# Patient Record
Sex: MALE | Race: BLACK OR AFRICAN AMERICAN | NOT HISPANIC OR LATINO | URBAN - METROPOLITAN AREA
[De-identification: names, ages, dates, MRNs, and addresses within clinical notes are randomized per-mention and may not be internally consistent; named-entity substitution may affect disease eponyms.]

---

## 2018-10-24 ENCOUNTER — EMERGENCY (EMERGENCY)
Facility: HOSPITAL | Age: 63
LOS: 1 days | Discharge: HOME | End: 2018-10-24
Attending: EMERGENCY MEDICINE | Admitting: EMERGENCY MEDICINE
Payer: MEDICARE

## 2018-10-24 VITALS
DIASTOLIC BLOOD PRESSURE: 110 MMHG | TEMPERATURE: 98 F | OXYGEN SATURATION: 99 % | RESPIRATION RATE: 18 BRPM | HEART RATE: 82 BPM | SYSTOLIC BLOOD PRESSURE: 215 MMHG

## 2018-10-24 VITALS
HEART RATE: 80 BPM | SYSTOLIC BLOOD PRESSURE: 149 MMHG | RESPIRATION RATE: 17 BRPM | DIASTOLIC BLOOD PRESSURE: 84 MMHG | OXYGEN SATURATION: 100 %

## 2018-10-24 LAB
BASOPHILS # BLD AUTO: 0.04 K/UL — SIGNIFICANT CHANGE UP (ref 0–0.2)
BASOPHILS NFR BLD AUTO: 0.5 % — SIGNIFICANT CHANGE UP (ref 0–2)
CK MB BLD-MCNC: 3.2 NG/ML — SIGNIFICANT CHANGE UP (ref 1–6.6)
CK SERPL-CCNC: 586 U/L — HIGH (ref 30–200)
EOSINOPHIL # BLD AUTO: 0.03 K/UL — SIGNIFICANT CHANGE UP (ref 0–0.5)
EOSINOPHIL NFR BLD AUTO: 0.4 % — SIGNIFICANT CHANGE UP (ref 0–6)
HCT VFR BLD CALC: 40.9 % — SIGNIFICANT CHANGE UP (ref 39–50)
HGB BLD-MCNC: 14 G/DL — SIGNIFICANT CHANGE UP (ref 13–17)
IMM GRANULOCYTES # BLD AUTO: 0.03 # — SIGNIFICANT CHANGE UP
IMM GRANULOCYTES NFR BLD AUTO: 0.4 % — SIGNIFICANT CHANGE UP (ref 0–1.5)
LYMPHOCYTES # BLD AUTO: 0.86 K/UL — LOW (ref 1–3.3)
LYMPHOCYTES # BLD AUTO: 11.5 % — LOW (ref 13–44)
MCHC RBC-ENTMCNC: 31.2 PG — SIGNIFICANT CHANGE UP (ref 27–34)
MCHC RBC-ENTMCNC: 34.2 % — SIGNIFICANT CHANGE UP (ref 32–36)
MCV RBC AUTO: 91.1 FL — SIGNIFICANT CHANGE UP (ref 80–100)
MONOCYTES # BLD AUTO: 0.27 K/UL — SIGNIFICANT CHANGE UP (ref 0–0.9)
MONOCYTES NFR BLD AUTO: 3.6 % — SIGNIFICANT CHANGE UP (ref 2–14)
NEUTROPHILS # BLD AUTO: 6.25 K/UL — SIGNIFICANT CHANGE UP (ref 1.8–7.4)
NEUTROPHILS NFR BLD AUTO: 83.6 % — HIGH (ref 43–77)
NRBC # FLD: 0 — SIGNIFICANT CHANGE UP
PLATELET # BLD AUTO: 199 K/UL — SIGNIFICANT CHANGE UP (ref 150–400)
PMV BLD: 11.6 FL — SIGNIFICANT CHANGE UP (ref 7–13)
RBC # BLD: 4.49 M/UL — SIGNIFICANT CHANGE UP (ref 4.2–5.8)
RBC # FLD: 11.8 % — SIGNIFICANT CHANGE UP (ref 10.3–14.5)
TROPONIN T, HIGH SENSITIVITY: 12 NG/L — SIGNIFICANT CHANGE UP (ref ?–14)
WBC # BLD: 7.48 K/UL — SIGNIFICANT CHANGE UP (ref 3.8–10.5)
WBC # FLD AUTO: 7.48 K/UL — SIGNIFICANT CHANGE UP (ref 3.8–10.5)

## 2018-10-24 PROCEDURE — 99283 EMERGENCY DEPT VISIT LOW MDM: CPT | Mod: 25,GC

## 2018-10-24 PROCEDURE — 93010 ELECTROCARDIOGRAM REPORT: CPT

## 2018-10-24 RX ADMIN — Medication 0.3 MILLIGRAM(S): at 21:34

## 2018-10-24 NOTE — ED ADULT NURSE NOTE - OBJECTIVE STATEMENT
pt arrives w/ c/o elevated bp and not feeling well. pt states he was at his pmd and told he had an abnormal EKG. pt states he has been feeling very weak lately but denies any chest pain/sob/n/v/abdominal pain. 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 pt states he ambulates with cane and has swelling to lower extremities normally but states the swelling went down. pt placed on cardiac monitor NS rhythm notes resp even and unlabored. waiting further orders will continue to monitor.

## 2018-10-24 NOTE — ED PROVIDER NOTE - CARE PLAN
Principal Discharge DX:	Uncontrolled hypertension  Assessment and plan of treatment:	You presented to the hospital for uncontrolled blood pressures with changes on the EKG.  You were given medication called Clonidine to control your blood pressure.  Your heart enzymes were unchanged. Please follow up with a Cardiologist in 1 week for further management.  Secondary Diagnosis:	EKG abnormalities

## 2018-10-24 NOTE — ED PROVIDER NOTE - PLAN OF CARE
You presented to the hospital for uncontrolled blood pressures with changes on the EKG.  You were given medication called Clonidine to control your blood pressure.  Your heart enzymes were unchanged. Please follow up with a Cardiologist in 1 week for further management.

## 2018-10-24 NOTE — ED PROVIDER NOTE - ATTENDING CONTRIBUTION TO CARE
I agree with the above H&P.  Briefly this is a 63 year old presenting with elevated bp and ekg changes. patient seen today at [facility] care for routine check up.  EKG with new t wave changes laterally.  bp elevated. patient without cp.  concern for new ekg changes. will check labs trops.

## 2018-10-24 NOTE — ED PROVIDER NOTE - MEDICAL DECISION MAKING DETAILS
62M remote CVA, uncontrolled HTN p/w hypertensive emergency s/p Clonidine 0.3mg in PMD office. New TWI V4-V6. No active CP. Give STAT dose Clonidine 0.3 now. Pending CBC, CMP, Cardiac Enzymes.   Likely Admit pending workup.

## 2018-10-24 NOTE — ED PROVIDER NOTE - OBJECTIVE STATEMENT
63M CVA 5 years ago with residual right sided weakness, HTN presenting to the ED from PMD office for uncontrolled HTN and EKG changes.   Patient reports that he went in for routine follow up visit and was found to have SBP>200s. Denies HA, lightheadedness, dizziness, chest pain, palpitations, abdominal pain. No worsening of focal deficits. Patient received total of Clonidine 0.3 in PMD office. R/p BP 5:00pm 136/67.   PMD: [facility] -->Dr. Ashley Meyer

## 2018-10-24 NOTE — ED ADULT NURSE NOTE - NSIMPLEMENTINTERV_GEN_ALL_ED
Implemented All Fall with Harm Risk Interventions:  [place] to call system. Call bell, personal items and telephone within reach. Instruct patient to call for assistance. Room bathroom lighting operational. Non-slip footwear when patient is off stretcher. Physically safe environment: no spills, clutter or unnecessary equipment. Stretcher in lowest position, wheels locked, appropriate side rails in place. Provide visual cue, wrist band, yellow gown, etc. Monitor gait and stability. Monitor for mental status changes and reorient to person, place, and time. Review medications for side effects contributing to fall risk. Reinforce activity limits and safety measures with patient and family. Provide visual clues: red socks.

## 2018-10-24 NOTE — ED ADULT NURSE NOTE - CHIEF COMPLAINT QUOTE
Pt brought to ER from [facility] by EMS after he went in because he did not feel well and was noted to have ST depressions on his EKG.

## 2018-10-24 NOTE — ED ADULT TRIAGE NOTE - CHIEF COMPLAINT QUOTE
Pt brought to ER from [facility] by EMS after he went in because he did not feel well and was noted to have ST depressions on his EKG.

## 2018-10-25 LAB
ALBUMIN SERPL ELPH-MCNC: 3.1 G/DL — LOW (ref 3.3–5)
ALP SERPL-CCNC: 74 U/L — SIGNIFICANT CHANGE UP (ref 40–120)
ALT FLD-CCNC: 20 U/L — SIGNIFICANT CHANGE UP (ref 4–41)
AST SERPL-CCNC: 21 U/L — SIGNIFICANT CHANGE UP (ref 4–40)
BILIRUB SERPL-MCNC: 0.3 MG/DL — SIGNIFICANT CHANGE UP (ref 0.2–1.2)
BUN SERPL-MCNC: 18 MG/DL — SIGNIFICANT CHANGE UP (ref 7–23)
CALCIUM SERPL-MCNC: 7.8 MG/DL — LOW (ref 8.4–10.5)
CHLORIDE SERPL-SCNC: 100 MMOL/L — SIGNIFICANT CHANGE UP (ref 98–107)
CK MB BLD-MCNC: 2.55 NG/ML — SIGNIFICANT CHANGE UP (ref 1–6.6)
CK SERPL-CCNC: 454 U/L — HIGH (ref 30–200)
CO2 SERPL-SCNC: 24 MMOL/L — SIGNIFICANT CHANGE UP (ref 22–31)
CREAT SERPL-MCNC: 1.16 MG/DL — SIGNIFICANT CHANGE UP (ref 0.5–1.3)
GLUCOSE SERPL-MCNC: 184 MG/DL — HIGH (ref 70–99)
POTASSIUM SERPL-MCNC: 3.2 MMOL/L — LOW (ref 3.5–5.3)
POTASSIUM SERPL-SCNC: 3.2 MMOL/L — LOW (ref 3.5–5.3)
PROT SERPL-MCNC: 6.3 G/DL — SIGNIFICANT CHANGE UP (ref 6–8.3)
SODIUM SERPL-SCNC: 138 MMOL/L — SIGNIFICANT CHANGE UP (ref 135–145)
TROPONIN T, HIGH SENSITIVITY: 13 NG/L — SIGNIFICANT CHANGE UP (ref ?–14)

## 2018-10-25 RX ORDER — POTASSIUM CHLORIDE 20 MEQ
40 PACKET (EA) ORAL ONCE
Qty: 0 | Refills: 0 | Status: COMPLETED | OUTPATIENT
Start: 2018-10-25 | End: 2018-10-25

## 2018-10-25 RX ADMIN — Medication 40 MILLIEQUIVALENT(S): at 00:45
